# Patient Record
Sex: MALE | Race: WHITE | Employment: OTHER | URBAN - METROPOLITAN AREA
[De-identification: names, ages, dates, MRNs, and addresses within clinical notes are randomized per-mention and may not be internally consistent; named-entity substitution may affect disease eponyms.]

---

## 2018-12-19 ENCOUNTER — OFFICE VISIT (OUTPATIENT)
Dept: INFECTIOUS DISEASES | Age: 83
End: 2018-12-19
Payer: MEDICARE

## 2018-12-19 VITALS
SYSTOLIC BLOOD PRESSURE: 95 MMHG | BODY MASS INDEX: 23.03 KG/M2 | TEMPERATURE: 97 F | OXYGEN SATURATION: 91 % | HEART RATE: 81 BPM | DIASTOLIC BLOOD PRESSURE: 60 MMHG | RESPIRATION RATE: 14 BRPM | WEIGHT: 170 LBS | HEIGHT: 72 IN

## 2018-12-19 DIAGNOSIS — A40.1 SEPTICEMIA DUE TO GROUP B STREPTOCOCCUS (HCC): ICD-10-CM

## 2018-12-19 DIAGNOSIS — M00.261 STREPTOCOCCAL ARTHRITIS OF RIGHT KNEE (HCC): Primary | ICD-10-CM

## 2018-12-19 PROBLEM — I87.2 STASIS DERMATITIS: Status: ACTIVE | Noted: 2018-12-19

## 2018-12-19 PROBLEM — I44.2 COMPLETE HEART BLOCK (HCC): Status: ACTIVE | Noted: 2017-08-30

## 2018-12-19 PROBLEM — I48.91 ATRIAL FIBRILLATION (HCC): Status: ACTIVE | Noted: 2017-03-02

## 2018-12-19 PROBLEM — N39.0 URINARY TRACT INFECTIOUS DISEASE: Status: ACTIVE | Noted: 2018-12-19

## 2018-12-19 PROBLEM — J44.9 COPD, VERY SEVERE (HCC): Status: ACTIVE | Noted: 2017-09-26

## 2018-12-19 PROBLEM — N47.1 PHIMOSIS: Status: ACTIVE | Noted: 2018-12-19

## 2018-12-19 PROBLEM — I25.5 ISCHEMIC CARDIOMYOPATHY: Status: ACTIVE | Noted: 2017-08-30

## 2018-12-19 PROBLEM — J06.9 UPPER RESPIRATORY INFECTION: Status: ACTIVE | Noted: 2018-12-19

## 2018-12-19 PROBLEM — J44.9 CHRONIC OBSTRUCTIVE LUNG DISEASE (HCC): Status: ACTIVE | Noted: 2017-11-02

## 2018-12-19 PROBLEM — E78.5 HYPERLIPIDEMIA: Status: ACTIVE | Noted: 2018-12-19

## 2018-12-19 PROBLEM — R78.81 BACTEREMIA: Status: ACTIVE | Noted: 2018-11-14

## 2018-12-19 PROBLEM — M10.061 ACUTE IDIOPATHIC GOUT OF RIGHT KNEE: Status: ACTIVE | Noted: 2018-11-15

## 2018-12-19 PROBLEM — J96.11 CHRONIC RESPIRATORY FAILURE WITH HYPOXIA (HCC): Status: ACTIVE | Noted: 2017-09-26

## 2018-12-19 PROBLEM — Z87.891 HISTORY OF TOBACCO USE: Status: ACTIVE | Noted: 2017-09-26

## 2018-12-19 PROBLEM — M17.11 PRIMARY OSTEOARTHRITIS OF RIGHT KNEE: Status: ACTIVE | Noted: 2018-11-30

## 2018-12-19 PROBLEM — I49.3 PREMATURE VENTRICULAR CONTRACTIONS: Status: ACTIVE | Noted: 2017-08-30

## 2018-12-19 PROCEDURE — G8484 FLU IMMUNIZE NO ADMIN: HCPCS | Performed by: INTERNAL MEDICINE

## 2018-12-19 PROCEDURE — 99213 OFFICE O/P EST LOW 20 MIN: CPT | Performed by: INTERNAL MEDICINE

## 2018-12-19 PROCEDURE — 1036F TOBACCO NON-USER: CPT | Performed by: INTERNAL MEDICINE

## 2018-12-19 PROCEDURE — G8598 ASA/ANTIPLAT THER USED: HCPCS | Performed by: INTERNAL MEDICINE

## 2018-12-19 PROCEDURE — G8427 DOCREV CUR MEDS BY ELIG CLIN: HCPCS | Performed by: INTERNAL MEDICINE

## 2018-12-19 PROCEDURE — G8420 CALC BMI NORM PARAMETERS: HCPCS | Performed by: INTERNAL MEDICINE

## 2018-12-19 PROCEDURE — 1101F PT FALLS ASSESS-DOCD LE1/YR: CPT | Performed by: INTERNAL MEDICINE

## 2018-12-19 PROCEDURE — 4040F PNEUMOC VAC/ADMIN/RCVD: CPT | Performed by: INTERNAL MEDICINE

## 2018-12-19 PROCEDURE — 1123F ACP DISCUSS/DSCN MKR DOCD: CPT | Performed by: INTERNAL MEDICINE

## 2018-12-19 RX ORDER — FUROSEMIDE 40 MG/1
TABLET ORAL
COMMUNITY
Start: 2018-10-10

## 2018-12-19 RX ORDER — SENNOSIDES 8.6 MG
CAPSULE ORAL
COMMUNITY

## 2018-12-19 RX ORDER — BENZONATATE 100 MG/1
CAPSULE ORAL
COMMUNITY

## 2018-12-19 RX ORDER — CHLORAL HYDRATE 500 MG
CAPSULE ORAL
COMMUNITY

## 2018-12-19 RX ORDER — PANTOPRAZOLE SODIUM 40 MG/1
40 TABLET, DELAYED RELEASE ORAL
COMMUNITY

## 2018-12-19 RX ORDER — CLOTRIMAZOLE AND BETAMETHASONE DIPROPIONATE 10; .5 MG/ML; MG/ML
LOTION TOPICAL
COMMUNITY

## 2018-12-19 RX ORDER — FLUTICASONE FUROATE AND VILANTEROL 200; 25 UG/1; UG/1
1 POWDER RESPIRATORY (INHALATION)
COMMUNITY

## 2018-12-19 RX ORDER — FLUTICASONE FUROATE AND VILANTEROL TRIFENATATE 100; 25 UG/1; UG/1
POWDER RESPIRATORY (INHALATION)
COMMUNITY
Start: 2018-10-10

## 2018-12-19 RX ORDER — METOPROLOL SUCCINATE 50 MG/1
TABLET, EXTENDED RELEASE ORAL
COMMUNITY

## 2018-12-19 RX ORDER — ATORVASTATIN CALCIUM 40 MG/1
40 TABLET, FILM COATED ORAL
COMMUNITY

## 2018-12-19 RX ORDER — ACETAMINOPHEN 500 MG
1000 TABLET ORAL
COMMUNITY
Start: 2018-11-19 | End: 2018-12-19

## 2018-12-19 RX ORDER — SPIRONOLACTONE 25 MG/1
25 TABLET ORAL
COMMUNITY
Start: 2018-03-26

## 2018-12-19 RX ORDER — ASPIRIN 81 MG/1
TABLET ORAL
COMMUNITY

## 2018-12-19 RX ORDER — TAMSULOSIN HYDROCHLORIDE 0.4 MG/1
0.8 CAPSULE ORAL
COMMUNITY
Start: 2018-06-14

## 2018-12-19 NOTE — PROGRESS NOTES
[facility]    Follow-up NOTE           Visit date: 12/19/2018      Reason for visit /chief complaints   Septic arthritis    Assessment:      Diagnosis Orders   1. Streptococcal arthritis of right knee ([address]     2. Septicemia due to group B Streptococcus (HCC)       · Right knee septic arthritis after steroid injection  · Right knee pseudogout  · Atrial fibrillation  · Hypertension  · COPD           Plan:     status post I and D  Continue ceftriaxone until December 29  Maintain PICC line until January 2 and call me that day with update so we can decide if PICC line can be discontinued  Continue weekly labs  ID clinic in 3-4 weeks    HPI:    Patient is a 4year-old male came in for follow-up after discharge from [facility].  He was seen by me during his hospital stay November and was diagnosed with right knee septic arthritis after steroid injection as well as group B strep septicemia. Status post IND. He is on ceftriaxone. Denies any fever or chills. No cough or shortness of breath. No vomiting or diarrhea.     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 6/26/2014    Atrial fibrillation ([address] 3/2/2017    Atrial flutter ([address] 8/14/2014    Bacteremia 11/14/2018    Benign non-nodular prostatic hyperplasia with lower urinary tract symptoms 12/7/2016    Benign prostatic hyperplasia 2/20/2014    Chronic obstructive lung disease ([address] 11/2/2017    Chronic respiratory failure with hypoxia (HCC) 9/26/2017    Chronic systolic heart failure ([address] 6/26/2014    Complete heart block ([address] 8/30/2017    Congestive heart failure ([address] 2/20/2014    COPD, very severe ([address] 9/26/2017    Dilated cardiomyopathy ([address] 6/26/2014    Dysuria 2/20/2014    Encounter for preprocedural cardiovascular examination 7/2/2015    History of tobacco use 9/26/2017    Hyperlipidemia 12/19/2018    (ASPIRIN ADULT LOW DOSE) 81 MG EC tablet, Adult Low Dose Aspirin 81 mg tablet,delayed release  Take 1 tablet every day by oral route., Disp: , Rfl:     acetaminophen (TYLENOL) 650 MG extended release tablet, acetaminophen  mg tablet,extended release  Take 2 tablets every 4 hours by oral route as needed. , Disp: , Rfl:     acetaminophen (TYLENOL) 500 MG tablet, Take 1,000 mg by mouth, Disp: , Rfl:      Review of Systems:  CONSTITUTIONAL:  negative  EYES:  negative  HEENT:  negative  RESPIRATORY:  negative  CARDIOVASCULAR:  negative  GASTROINTESTINAL:  negative  GENITOURINARY:  negative  INTEGUMENT/BREAST:  negative  HEMATOLOGIC/LYMPHATIC:  negative  ALLERGIC/IMMUNOLOGIC:  negative  ENDOCRINE:  negative  MUSCULOSKELETAL:  negative  NEUROLOGICAL:  negative  BEHAVIOR/PSYCH:  negative    BP 95/60 (Site: Right Upper Arm, Position: Sitting, Cuff Size: Medium Adult)   Pulse 81   Temp 97 °F (36.1 °C) (Oral)   Resp 14   Ht 5' 11.5\" (1.816 m)   Wt 170 lb (77.1 kg)   SpO2 91% Comment: patient on 2 liters of oxygen  BMI 23.38 kg/m²       EXAM:  CONSTITUTIONAL:  awake, alert, cooperative, no apparent distress,  EYES: conjunctiva normal  ENT:  Normocephalic, without obvious abnormality, atraumatic,  LUNGS:  No increased work of breathing, good air exchange, clear to auscultation bilaterally, no crackles or wheezing  CARDIOVASCULAR:  regular rate and rhythm, normal S1 and S2,  no murmur noted  ABDOMEN:   normal bowel sounds, soft, non-distended, non-tender, no masses palpated, no hepatosplenomegally,   MUSCULOSKELETAL:   right knee is healing  NEUROLOGIC:  Awake, alert, oriented to name, place and time  SKIN:  No rash      Data Review:   reviewed       IMAGING:          Justina Beck MD  12/19/2018  4:38 PM      This note was completed using a voice transcription system. Every effort was made to ensure accuracy. However, inadvertent computerized transcription errors may be present.

## 2019-01-15 ENCOUNTER — TELEPHONE (OUTPATIENT)
Dept: INFECTIOUS DISEASES | Age: 84
End: 2019-01-15

## 2020-11-03 PROBLEM — J44.9 CHRONIC OBSTRUCTIVE LUNG DISEASE (HCC): Status: RESOLVED | Noted: 2017-11-02 | Resolved: 2020-11-03
